# Patient Record
Sex: FEMALE | Race: WHITE | NOT HISPANIC OR LATINO | URBAN - METROPOLITAN AREA
[De-identification: names, ages, dates, MRNs, and addresses within clinical notes are randomized per-mention and may not be internally consistent; named-entity substitution may affect disease eponyms.]

---

## 2020-09-23 ENCOUNTER — TELEPHONE ENCOUNTER (OUTPATIENT)
Dept: URBAN - METROPOLITAN AREA CLINIC 19 | Facility: CLINIC | Age: 65
End: 2020-09-23

## 2020-09-23 RX ORDER — MESALAMINE 1.2 G/1
TAKE FOUR TABLETS BY MOUTH DAILY WITH A MEAL TABLET, DELAYED RELEASE ORAL
Qty: 360 | Refills: 1
Start: 2020-02-25

## 2020-10-04 ENCOUNTER — ERX REFILL RESPONSE (OUTPATIENT)
Age: 65
End: 2020-10-04

## 2020-10-04 RX ORDER — MESALAMINE 1.2 G/1
TAKE FOUR TABLETS BY MOUTH DAILY WITH A MEAL TABLET, DELAYED RELEASE ORAL
Qty: 120 | Refills: 0

## 2020-12-23 ENCOUNTER — TELEPHONE ENCOUNTER (OUTPATIENT)
Dept: URBAN - METROPOLITAN AREA CLINIC 19 | Facility: CLINIC | Age: 65
End: 2020-12-23

## 2021-02-09 ENCOUNTER — TELEPHONE ENCOUNTER (OUTPATIENT)
Dept: URBAN - METROPOLITAN AREA CLINIC 19 | Facility: CLINIC | Age: 66
End: 2021-02-09

## 2021-02-09 RX ORDER — MESALAMINE 1.2 G/1
TAKE FOUR TABLETS BY MOUTH DAILY WITH A MEAL TABLET, DELAYED RELEASE ORAL ONCE A DAY
Qty: 360 | Refills: 3

## 2021-09-30 ENCOUNTER — TELEPHONE ENCOUNTER (OUTPATIENT)
Dept: URBAN - METROPOLITAN AREA CLINIC 19 | Facility: CLINIC | Age: 66
End: 2021-09-30

## 2021-09-30 RX ORDER — MESALAMINE 1.2 G/1
TAKE FOUR TABLETS BY MOUTH DAILY WITH A MEAL TABLET, DELAYED RELEASE ORAL ONCE A DAY
Qty: 360 | Refills: 3
End: 2022-09-25

## 2021-10-11 ENCOUNTER — ERX REFILL RESPONSE (OUTPATIENT)
Dept: URBAN - METROPOLITAN AREA CLINIC 19 | Facility: CLINIC | Age: 66
End: 2021-10-11

## 2021-10-11 RX ORDER — MESALAMINE 1.2 G/1
TAKE FOUR TABLETS BY MOUTH DAILY WITH A MEAL TABLET, DELAYED RELEASE ORAL
Qty: 120 TABLET | Refills: 12 | OUTPATIENT

## 2021-10-11 RX ORDER — MESALAMINE 1.2 G/1
TAKE FOUR TABLETS BY MOUTH DAILY WITH A MEAL TABLET, DELAYED RELEASE ORAL ONCE A DAY
Qty: 360 | Refills: 3 | OUTPATIENT

## 2021-12-22 ENCOUNTER — TELEPHONE ENCOUNTER (OUTPATIENT)
Dept: URBAN - METROPOLITAN AREA CLINIC 19 | Facility: CLINIC | Age: 66
End: 2021-12-22

## 2021-12-30 ENCOUNTER — ERX REFILL RESPONSE (OUTPATIENT)
Dept: URBAN - METROPOLITAN AREA CLINIC 19 | Facility: CLINIC | Age: 66
End: 2021-12-30

## 2021-12-30 RX ORDER — HYOSCYAMINE SULFATE 0.38 MG/1
TAKE 1 TABLET BY MOUTH AT BEDTIME TABLET ORAL
Qty: 30 TABLET | Refills: 6 | OUTPATIENT

## 2022-01-24 ENCOUNTER — WEB ENCOUNTER (OUTPATIENT)
Dept: URBAN - METROPOLITAN AREA CLINIC 19 | Facility: CLINIC | Age: 67
End: 2022-01-24

## 2022-01-24 ENCOUNTER — OFFICE VISIT (OUTPATIENT)
Dept: URBAN - METROPOLITAN AREA CLINIC 19 | Facility: CLINIC | Age: 67
End: 2022-01-24
Payer: COMMERCIAL

## 2022-01-24 DIAGNOSIS — E55.9 VITAMIN D DEFICIENCY: ICD-10-CM

## 2022-01-24 DIAGNOSIS — K50.111 CROHN'S DISEASE OF COLON WITH RECTAL BLEEDING: ICD-10-CM

## 2022-01-24 DIAGNOSIS — R10.9 ABDOMINAL CRAMPING: ICD-10-CM

## 2022-01-24 PROBLEM — 21522001 ABDOMINAL PAIN: Status: ACTIVE | Noted: 2022-01-24

## 2022-01-24 PROCEDURE — 99214 OFFICE O/P EST MOD 30 MIN: CPT | Performed by: INTERNAL MEDICINE

## 2022-01-24 RX ORDER — MESALAMINE 1.2 G/1
TAKE FOUR TABLETS BY MOUTH DAILY WITH A MEAL TABLET, DELAYED RELEASE ORAL
Qty: 120 TABLET | Refills: 12 | Status: ACTIVE | COMMUNITY

## 2022-01-24 RX ORDER — PRAVASTATIN SODIUM 20 MG/1
TABLET ORAL
Qty: 0 | Refills: 0 | Status: DISCONTINUED | COMMUNITY
Start: 1900-01-01

## 2022-01-24 RX ORDER — ERGOCALCIFEROL 1.25 MG/1
TAKE 1 CAPSULE (50,000 UNIT) BY ORAL ROUTE ONCE WEEKLY FOR 56 DAYS CAPSULE ORAL
Qty: 8 | Refills: 1 | Status: DISCONTINUED | COMMUNITY
Start: 2017-06-12

## 2022-01-24 RX ORDER — SODIUM, POTASSIUM,MAG SULFATES 17.5-3.13G
254 ML SOLUTION, RECONSTITUTED, ORAL ORAL ONCE
Qty: 1 KIT | Refills: 0 | OUTPATIENT
Start: 2022-01-24 | End: 2022-01-25

## 2022-01-24 RX ORDER — HYOSCYAMINE SULFATE 0.38 MG/1
TAKE 1 TABLET BY MOUTH AT BEDTIME TABLET ORAL
Qty: 30 TABLET | Refills: 6 | Status: ACTIVE | COMMUNITY

## 2022-01-24 RX ORDER — DIPHENHYDRAMINE HCL 2 %
CREAM (GRAM) TOPICAL
Qty: 0 | Refills: 0 | Status: ACTIVE | COMMUNITY
Start: 1900-01-01

## 2022-01-24 RX ORDER — HYOSCYAMINE SULFATE 0.38 MG/1
TAKE 1 TABLET BY MOUTH AT BEDTIME TABLET ORAL AT BEDTIME
Qty: 90 | Refills: 3

## 2022-01-24 NOTE — HPI-TODAY'S VISIT:
17: The patient was admitted at [facility] last month for severe pancolitis seen on imaging; she was having 3-4 weeks of bloody diarrhea with cramping. I performed her colonoscopy which revealed Crohn's pancolitis. There were granulomas in the rectal biopsies; the terminal ileum was spared. No extraintestinal manifestations.  The patient was started on Lialda 4.8 g/day while tapering off her steroids. She feels well from a GI standpoint - no abdominal pain and BMs are less frequent. However, she has to wake up every morning at 4AM to have a BM, which takes her over an hour. She is fatigued. She also complains of scalp itching and over her arms for the past week without a rash.   17: Patient has been doing well since switching her Lialda to evening dosing. No waking up to have a BM. Saw dermatologist about rash - do not think it is related to her Lialda. No diarrhea or blood in stool. Found to be vitamin D deficient - on therapy.   17: Patient returns for reevaluation of her Crohn's pancolitis. She is doing great - her only complaint is that she has to wake up at 4:00AM to have a BM. However, she is also waking up at other times during the night to urinate. Possible overactive bladder/IBS?  18: Patient returns for follow-up of Crohn's disease. She is doing very well. No problems today.  10/16/18: Patient presents for follow-up. On 17, I diagnosed her with Crohn's disease with colonoscopy. She is doing well on her current medications.  19: Patient presents for follow-up. She is currently on Lialda 2.4 g/day and uses hyoscyamine 0.325 mg daily for cramping. Currently on vitamin D 4000 IU/day.  20: Patient presents after a 10-month hiatus. She states that she has fever, aches, soft stools, and gas. She was recently on antibiotics (clarithromycin and cefdinir) for a vague febrile illness. She is compliant with Lialda and hyoscyamine/dicyclomine. Also on tumeric. On 19, I performed her colonoscopy that revealed minimal inflammation in the transverse colon/ascending colon. No dysplasia.  22:  Patient presents for follow-up of her Crohn's colitis.  She is doing well - no diarrhea, cramping, or blood in stool.  Her  Don (also my patient)  in 2021 of cancer - she started having some bowel issues then and started taking 4 Lialda capsules instead of 2.  Now she is back to 2, and she is sleeping well with no symptoms.  Asked about turmeric for arthritis vs. ibuprofen - I have no issues with turmeric.  Due for surveillance colonoscopy.

## 2022-02-03 LAB
A/G RATIO: 1.8
ALBUMIN: 4.8
ALKALINE PHOSPHATASE: 90
ALT (SGPT): 29
AST (SGOT): 29
BILIRUBIN, TOTAL: 1
BUN/CREATININE RATIO: 17
BUN: 16
C-REACTIVE PROTEIN, QUANT: 3
CALCIUM: 9.4
CARBON DIOXIDE, TOTAL: 20
CHLORIDE: 102
CREATININE: 0.96
EGFR IF AFRICN AM: 72
EGFR IF NONAFRICN AM: 63
GLOBULIN, TOTAL: 2.6
GLUCOSE: 92
HEMATOCRIT: 41.7
HEMOGLOBIN: 15.1
MCH: 36
MCHC: 36.2
MCV: 99
NRBC: (no result)
PLATELETS: 273
POTASSIUM: 4.4
PROTEIN, TOTAL: 7.4
RBC: 4.2
RDW: 15.3
SEDIMENTATION RATE-WESTERGREN: 21
SODIUM: 139
VITAMIN B12: 219
VITAMIN D, 25-HYDROXY: 24.1
WBC: 7.4

## 2022-02-04 ENCOUNTER — TELEPHONE ENCOUNTER (OUTPATIENT)
Dept: URBAN - METROPOLITAN AREA CLINIC 19 | Facility: CLINIC | Age: 67
End: 2022-02-04

## 2022-02-04 PROBLEM — 190634004 VITAMIN B12 DEFICIENCY: Status: ACTIVE | Noted: 2022-02-04

## 2022-02-04 RX ORDER — HYOSCYAMINE SULFATE 0.38 MG/1
TAKE 1 TABLET BY MOUTH AT BEDTIME TABLET ORAL AT BEDTIME
Qty: 90 | Refills: 3 | Status: ACTIVE | COMMUNITY

## 2022-02-04 RX ORDER — MESALAMINE 1.2 G/1
TAKE FOUR TABLETS BY MOUTH DAILY WITH A MEAL TABLET, DELAYED RELEASE ORAL
Qty: 120 TABLET | Refills: 12 | Status: ACTIVE | COMMUNITY

## 2022-02-04 RX ORDER — DIPHENHYDRAMINE HCL 2 %
CREAM (GRAM) TOPICAL
Qty: 0 | Refills: 0 | Status: ACTIVE | COMMUNITY
Start: 1900-01-01

## 2022-02-12 LAB
INTERPRETATION: (no result)
VITAMIN B12: 1601

## 2022-02-20 ENCOUNTER — WEB ENCOUNTER (OUTPATIENT)
Dept: URBAN - METROPOLITAN AREA CLINIC 19 | Facility: CLINIC | Age: 67
End: 2022-02-20

## 2022-02-20 RX ORDER — MESALAMINE 1.2 G/1
TAKE FOUR TABLETS BY MOUTH DAILY WITH A MEAL TABLET, DELAYED RELEASE ORAL ONCE A DAY
Qty: 360 | Refills: 1

## 2022-07-29 ENCOUNTER — TELEPHONE ENCOUNTER (OUTPATIENT)
Dept: URBAN - METROPOLITAN AREA CLINIC 19 | Facility: CLINIC | Age: 67
End: 2022-07-29

## 2022-07-29 RX ORDER — SODIUM, POTASSIUM,MAG SULFATES 17.5-3.13G
254 ML SOLUTION, RECONSTITUTED, ORAL ORAL ONCE
Qty: 1 KIT | Refills: 0
Start: 2022-01-24 | End: 2022-07-30

## 2022-08-09 ENCOUNTER — TELEPHONE ENCOUNTER (OUTPATIENT)
Dept: URBAN - METROPOLITAN AREA CLINIC 19 | Facility: CLINIC | Age: 67
End: 2022-08-09

## 2022-08-09 ENCOUNTER — OFFICE VISIT (OUTPATIENT)
Dept: URBAN - METROPOLITAN AREA SURGERY CENTER 31 | Facility: SURGERY CENTER | Age: 67
End: 2022-08-09

## 2022-08-09 RX ORDER — MESALAMINE 1.2 G/1
TAKE FOUR TABLETS BY MOUTH DAILY WITH A MEAL TABLET, DELAYED RELEASE ORAL ONCE A DAY
Qty: 360 | Refills: 1 | Status: ACTIVE | COMMUNITY

## 2022-08-09 RX ORDER — DIPHENHYDRAMINE HCL 2 %
CREAM (GRAM) TOPICAL
Qty: 0 | Refills: 0 | Status: ACTIVE | COMMUNITY
Start: 1900-01-01

## 2022-08-09 RX ORDER — HYOSCYAMINE SULFATE 0.38 MG/1
TAKE 1 TABLET BY MOUTH AT BEDTIME TABLET ORAL AT BEDTIME
Qty: 90 | Refills: 3 | Status: ACTIVE | COMMUNITY

## 2022-09-13 ENCOUNTER — TELEPHONE ENCOUNTER (OUTPATIENT)
Dept: URBAN - METROPOLITAN AREA CLINIC 19 | Facility: CLINIC | Age: 67
End: 2022-09-13

## 2022-09-13 RX ORDER — HYOSCYAMINE SULFATE 0.38 MG/1
TAKE 1 TABLET BY MOUTH AT BEDTIME TABLET ORAL AT BEDTIME
Qty: 90 | Refills: 3
End: 2023-09-08

## 2022-09-14 ENCOUNTER — ERX REFILL RESPONSE (OUTPATIENT)
Dept: URBAN - METROPOLITAN AREA CLINIC 19 | Facility: CLINIC | Age: 67
End: 2022-09-14

## 2022-09-14 RX ORDER — MESALAMINE 1.2 G/1
TAKE FOUR TABLETS BY MOUTH DAILY WITH A MEAL TABLET, DELAYED RELEASE ORAL ONCE A DAY
Qty: 360 | Refills: 1 | OUTPATIENT

## 2022-09-14 RX ORDER — MESALAMINE 1.2 G/1
TAKE 4 TABLETS BY MOUTH DAILY WITH A MEAL TABLET, DELAYED RELEASE ORAL
Qty: 360 TABLET | Refills: 3 | OUTPATIENT

## 2023-01-03 ENCOUNTER — ERX REFILL RESPONSE (OUTPATIENT)
Dept: URBAN - METROPOLITAN AREA CLINIC 19 | Facility: CLINIC | Age: 68
End: 2023-01-03

## 2023-01-03 RX ORDER — HYOSCYAMINE SULFATE 0.38 MG/1
TAKE 1 TABLET BY MOUTH AT BEDTIME TABLET ORAL AT BEDTIME
Qty: 90 | Refills: 3 | OUTPATIENT

## 2023-01-03 RX ORDER — HYOSCYAMINE SULFATE 0.38 MG/1
TAKE 1 TABLET BY MOUTH AT BEDTIME TABLET ORAL
Qty: 90 | Refills: 3 | OUTPATIENT

## 2023-01-05 ENCOUNTER — TELEPHONE ENCOUNTER (OUTPATIENT)
Dept: URBAN - METROPOLITAN AREA CLINIC 19 | Facility: CLINIC | Age: 68
End: 2023-01-05

## 2023-01-05 RX ORDER — HYOSCYAMINE SULFATE 0.38 MG/1
TAKE 1 TABLET BY MOUTH AT BEDTIME TABLET ORAL
Qty: 90 | Refills: 3

## 2023-01-06 ENCOUNTER — P2P PATIENT RECORD (OUTPATIENT)
Age: 68
End: 2023-01-06

## 2024-02-09 ENCOUNTER — LAB (OUTPATIENT)
Dept: URBAN - METROPOLITAN AREA MEDICAL CENTER 25 | Facility: MEDICAL CENTER | Age: 69
End: 2024-02-09
Payer: MEDICARE

## 2024-02-09 DIAGNOSIS — K31.89 ACHYLIA: ICD-10-CM

## 2024-02-09 DIAGNOSIS — D50.0 1. ANEMIA, IRON DEFICIENCY FROM CHRONIC BLOOD LOSS:: ICD-10-CM

## 2024-02-09 DIAGNOSIS — K22.89 DILATATION OF ESOPHAGUS: ICD-10-CM

## 2024-02-09 DIAGNOSIS — R10.84 ABDOMINAL CRAMPING, GENERALIZED: ICD-10-CM

## 2024-02-09 PROCEDURE — 43239 EGD BIOPSY SINGLE/MULTIPLE: CPT | Performed by: INTERNAL MEDICINE

## 2024-02-09 PROCEDURE — 43235 EGD DIAGNOSTIC BRUSH WASH: CPT | Performed by: INTERNAL MEDICINE

## 2024-02-09 PROCEDURE — 99254 IP/OBS CNSLTJ NEW/EST MOD 60: CPT | Performed by: INTERNAL MEDICINE

## 2024-02-10 ENCOUNTER — LAB (OUTPATIENT)
Dept: URBAN - METROPOLITAN AREA MEDICAL CENTER 25 | Facility: MEDICAL CENTER | Age: 69
End: 2024-02-10
Payer: MEDICARE

## 2024-02-10 DIAGNOSIS — K50.90 ABDOMINAL PAIN DESPITE THERAPY FOR CROHN'S DISEASE: ICD-10-CM

## 2024-02-10 DIAGNOSIS — R10.13 ABDOMINAL DISCOMFORT, EPIGASTRIC: ICD-10-CM

## 2024-02-10 DIAGNOSIS — K31.89 ACHYLIA: ICD-10-CM

## 2024-02-10 PROCEDURE — 99232 SBSQ HOSP IP/OBS MODERATE 35: CPT | Performed by: INTERNAL MEDICINE

## 2024-03-27 ENCOUNTER — LAB (OUTPATIENT)
Dept: URBAN - METROPOLITAN AREA CLINIC 19 | Facility: CLINIC | Age: 69
End: 2024-03-27

## 2024-03-27 ENCOUNTER — OV HOSPF/U (OUTPATIENT)
Dept: URBAN - METROPOLITAN AREA CLINIC 19 | Facility: CLINIC | Age: 69
End: 2024-03-27
Payer: MEDICARE

## 2024-03-27 VITALS
OXYGEN SATURATION: 96 % | HEART RATE: 102 BPM | WEIGHT: 137.4 LBS | TEMPERATURE: 97.1 F | HEIGHT: 67 IN | BODY MASS INDEX: 21.56 KG/M2 | DIASTOLIC BLOOD PRESSURE: 76 MMHG | SYSTOLIC BLOOD PRESSURE: 124 MMHG

## 2024-03-27 DIAGNOSIS — K22.10 EROSIVE ESOPHAGITIS: ICD-10-CM

## 2024-03-27 DIAGNOSIS — R63.0 APPETITE ABSENT: ICD-10-CM

## 2024-03-27 DIAGNOSIS — K50.90 CROHNS DISEASE: ICD-10-CM

## 2024-03-27 DIAGNOSIS — Z09 HOSPITAL DISCHARGE FOLLOW-UP: ICD-10-CM

## 2024-03-27 DIAGNOSIS — K25.3 GASTRIC ULCER: ICD-10-CM

## 2024-03-27 PROCEDURE — 99215 OFFICE O/P EST HI 40 MIN: CPT | Performed by: NURSE PRACTITIONER

## 2024-03-27 RX ORDER — PANTOPRAZOLE SODIUM 40 MG/1
1 TABLET TABLET, DELAYED RELEASE ORAL ONCE A DAY
Status: ACTIVE | COMMUNITY

## 2024-03-27 RX ORDER — MIRTAZAPINE 15 MG/1
1 TABLET AT BEDTIME TABLET, FILM COATED ORAL ONCE A DAY
Qty: 90 TABLET | Refills: 3 | OUTPATIENT
Start: 2024-03-27

## 2024-03-27 RX ORDER — PANTOPRAZOLE SODIUM 40 MG/1
1 TABLET TABLET, DELAYED RELEASE ORAL TWICE A DAY
Qty: 180 TABLET | Refills: 0 | OUTPATIENT
Start: 2024-03-27

## 2024-03-27 RX ORDER — MIRTAZAPINE 15 MG/1
1 TABLET AT BEDTIME TABLET, FILM COATED ORAL ONCE A DAY
Status: ACTIVE | COMMUNITY

## 2024-03-27 RX ORDER — SODIUM, POTASSIUM,MAG SULFATES 17.5-3.13G
177ML SOLUTION, RECONSTITUTED, ORAL ORAL
Qty: 1 | Refills: 0 | OUTPATIENT
Start: 2024-03-27 | End: 2024-03-28

## 2024-03-27 NOTE — HPI-TODAY'S VISIT:
17: The patient was admitted at [facility] last month for severe pancolitis seen on imaging; she was having 3-4 weeks of bloody diarrhea with cramping. I performed her colonoscopy which revealed Crohn's pancolitis. There were granulomas in the rectal biopsies; the terminal ileum was spared. No extraintestinal manifestations.        The patient was started on Lialda 4.8 g/day while tapering off her steroids. She feels well from a GI standpoint - no abdominal pain and BMs are less frequent. However, she has to wake up every morning at 4AM to have a BM, which takes her over an hour. She is fatigued. She also complains of scalp itching and over her arms for the past week without a rash.        17: Patient has been doing well since switching her Lialda to evening dosing. No waking up to have a BM. Saw dermatologist about rash - do not think it is related to her Lialda. No diarrhea or blood in stool. Found to be vitamin D deficient - on therapy.        17: Patient returns for reevaluation of her Crohn's pancolitis. She is doing great - her only complaint is that she has to wake up at 4:00AM to have a BM. However, she is also waking up at other times during the night to urinate. Possible overactive bladder/IBS?        18: Patient returns for follow-up of Crohn's disease. She is doing very well. No problems today.        10/16/18: Patient presents for follow-up. On 17, I diagnosed her with Crohn's disease with colonoscopy. She is doing well on her current medications.        19: Patient presents for follow-up. She is currently on Lialda 2.4 g/day and uses hyoscyamine 0.325 mg daily for cramping. Currently on vitamin D 4000 IU/day.        20: Patient presents after a 10-month hiatus. She states that she has fever, aches, soft stools, and gas. She was recently on antibiotics (clarithromycin and cefdinir) for a vague febrile illness. She is compliant with Lialda and hyoscyamine/dicyclomine. Also on tumeric. On 19, I performed her colonoscopy that revealed minimal inflammation in the transverse colon/ascending colon. No dysplasia.        22: Patient presents for follow-up of her Crohn's colitis. She is doing well - no diarrhea, cramping, or blood in stool. Her  Don (also my patient)  in 2021 of cancer - she started having some bowel issues then and started taking 4 Lialda capsules instead of 2. Now she is back to 2, and she is sleeping well with no symptoms. Asked about turmeric for arthritis vs. ibuprofen - I have no issues with turmeric. Due for surveillance colonoscopy. ----------------------------------------------------------    3/27/2024 (Ryann):  69 yo F with PMH of Crohn's disease in reported remission presents today for hospital follow-up. Admitted to [facility] 2024 - 2024. Presented with c/o worsening weakness, abdominal pain with N/V, and diarrhea over the prior month. Symptoms started 2023. Also reported 30 pound weight loss and intermittent fevers. Reported being on Lialda but she had stopped on her own when she started taking care of her ill . Had not seen a doctor in 3 years. Reported she had been taking NSAIDs daily. Reported darker than usual stools. CT A/P with contrast noted evidence of inflammatory stranding in the distal stomach and proximal duodenum consistent with gastritis/duodenitis with possible ulcer involving the posterior gastric antrum.  She had a colonoscopy on 2017 due to lower abdominal pain with diarrhea and abnormal CT scan noting inflammation from the cecum to the rectum secondary to pan ulcerative colitis. She reported initially being diagnosed with UC but then was diagnosed with Crohn's later. Admission CBC: WBC 10.3, Hg 10.7, . LFTs normal, creatinine 0.7, CRP elevated 7.710, lipase normal 59 magnesium 1.8 cardiac enzymes were normal  EGD was performed by Dr. Rogers on 2024 -- LA grade C chronic and erosive esophagitis with bleeding was found. 4 cm hiatal hernia.  Nonbleeding gastric ulcer with no stigmata of bleeding.  Chronic erosive gastritis.  Nonbleeding duodenal ulcer with no stigmata of bleeding.  Normal second portion of duodenum.  Protonix 40mg BID and sucralfate ordered. Recommended antireflux regimen indefinitely.  Path: random gastric biopsy was negative for H. pylori  Today - she denies having any pain, but still feeling very weak and no appetite. She states she was ordered Mirtazapine but she ran out of script a month ago. She stopped all NSAIDs. She has Tylenol but has not had to take any. She also ran out of Protonix. No bloody or black stools. Having formed/regular stools. She feels reflux is worse. A lot of times, has difficulty sleeping at night b/c of it.  Appears she last saw Dr. Oscar 2022 and at the time she was on Lialda 2 tabs/day. He ordered a colonoscopy but she never followed up.

## 2024-03-28 LAB
A/G RATIO: 1.4
ALBUMIN: 3.6
ALKALINE PHOSPHATASE: 71
ALT (SGPT): 17
AST (SGOT): 22
BILIRUBIN, TOTAL: 0.6
BUN/CREATININE RATIO: (no result)
BUN: 18
CALCIUM: 9
CARBON DIOXIDE, TOTAL: 25
CHLORIDE: 102
CREATININE: 0.91
EGFR: 70
FERRITIN, SERUM: 25
FOLATE (FOLIC ACID), SERUM: 2.3
GLOBULIN, TOTAL: 2.5
GLUCOSE: 88
HEMATOCRIT: 39.3
HEMOGLOBIN: 13
IRON BIND.CAP.(TIBC): 313
IRON SATURATION: 17
IRON: 54
MCH: 31.3
MCHC: 33.1
MCV: 94.5
MPV: 9.7
PLATELET COUNT: 361
POTASSIUM: 3.1
PROTEIN, TOTAL: 6.1
RDW: 13.3
RED BLOOD CELL COUNT: 4.16
SODIUM: 140
VITAMIN B12: 423
VITAMIN D,25-OH,TOTAL,IA: 51
WHITE BLOOD CELL COUNT: 9.8

## 2024-07-02 ENCOUNTER — ERX REFILL RESPONSE (OUTPATIENT)
Dept: URBAN - METROPOLITAN AREA CLINIC 19 | Facility: CLINIC | Age: 69
End: 2024-07-02

## 2024-07-02 RX ORDER — PANTOPRAZOLE 40 MG/1
TAKE 1 TABLET BY MOUTH 2 TIMES A DAY TABLET, DELAYED RELEASE ORAL
Qty: 180 TABLET | Refills: 0 | OUTPATIENT

## 2024-07-02 RX ORDER — PANTOPRAZOLE SODIUM 40 MG/1
1 TABLET TABLET, DELAYED RELEASE ORAL TWICE A DAY
Qty: 180 TABLET | Refills: 0 | OUTPATIENT